# Patient Record
Sex: FEMALE | Race: WHITE
[De-identification: names, ages, dates, MRNs, and addresses within clinical notes are randomized per-mention and may not be internally consistent; named-entity substitution may affect disease eponyms.]

---

## 2017-02-09 ENCOUNTER — HOSPITAL ENCOUNTER (OUTPATIENT)
Dept: HOSPITAL 47 - RADMAMWWP | Age: 82
Discharge: HOME | End: 2017-02-09
Payer: MEDICARE

## 2017-02-09 DIAGNOSIS — Z12.31: Primary | ICD-10-CM

## 2017-02-09 PROCEDURE — 77063 BREAST TOMOSYNTHESIS BI: CPT

## 2017-02-10 NOTE — MM
Reason for exam: screening  (asymptomatic).

Last mammogram was performed 1 year and 1 month ago.



History:

Patient is postmenopausal and has history of high-risk lesion on a previous biopsy

at age 70.

High risk left mammotome panel of the left breast, December 28, 2005.



Physical Findings:

A clinical breast exam by your physician is recommended on an annual basis and 

results should be correlated with mammographic findings.



MG 3D Screening Mammo W/Cad

Bilateral CC and MLO view(s) were taken.

Prior study comparison: January 8, 2016, bilateral MG screening mammo w CAD.  

December 30, 2014, bilateral MG screening mammo w CAD.

The breast tissue is almost entirely fat.  Previous mammotome biopsy in the left 

breast. Asymmetric breast tissue in the right breast is stable with 

calcifications.  No significant changes when compared with prior studies.





ASSESSMENT: Benign, BI-RAD 2



RECOMMENDATION:

Routine screening mammogram of both breasts in 1 year.

## 2017-03-09 ENCOUNTER — HOSPITAL ENCOUNTER (OUTPATIENT)
Dept: HOSPITAL 47 - PROCWHC3 | Age: 82
Discharge: HOME | End: 2017-03-09
Payer: MEDICARE

## 2017-03-09 VITALS
SYSTOLIC BLOOD PRESSURE: 153 MMHG | TEMPERATURE: 98.4 F | HEART RATE: 62 BPM | DIASTOLIC BLOOD PRESSURE: 72 MMHG | RESPIRATION RATE: 18 BRPM

## 2017-03-09 DIAGNOSIS — M81.0: Primary | ICD-10-CM

## 2017-03-09 PROCEDURE — 96372 THER/PROPH/DIAG INJ SC/IM: CPT

## 2017-09-28 ENCOUNTER — HOSPITAL ENCOUNTER (OUTPATIENT)
Dept: HOSPITAL 47 - PROCWHC3 | Age: 82
Discharge: HOME | End: 2017-09-28
Payer: MEDICARE

## 2017-09-28 VITALS
HEART RATE: 65 BPM | RESPIRATION RATE: 18 BRPM | DIASTOLIC BLOOD PRESSURE: 72 MMHG | SYSTOLIC BLOOD PRESSURE: 137 MMHG | TEMPERATURE: 97.9 F

## 2017-09-28 DIAGNOSIS — M81.0: Primary | ICD-10-CM

## 2017-09-28 PROCEDURE — 96372 THER/PROPH/DIAG INJ SC/IM: CPT

## 2018-09-21 ENCOUNTER — HOSPITAL ENCOUNTER (OUTPATIENT)
Dept: HOSPITAL 47 - RADMAMWWP | Age: 83
Discharge: HOME | End: 2018-09-21
Attending: INTERNAL MEDICINE
Payer: MEDICARE

## 2018-09-21 DIAGNOSIS — R92.8: Primary | ICD-10-CM

## 2018-09-21 PROCEDURE — 77065 DX MAMMO INCL CAD UNI: CPT

## 2018-09-21 PROCEDURE — 76642 ULTRASOUND BREAST LIMITED: CPT

## 2018-09-21 PROCEDURE — 77061 BREAST TOMOSYNTHESIS UNI: CPT

## 2018-09-24 NOTE — MM
Reason for exam: additional evaluation requested from abnormal screening.

Last mammogram was performed less than 1 month ago.



History:

Patient is postmenopausal and has history of high-risk lesion on a previous biopsy

at age 70.

High risk left mammotome panel of the left breast, December 28, 2005.



Physical Findings:

Nurse Summary: 1 x 1cm nodule in the left breast at 9 o'clock (nurse ts).



MG 3D Work Up W/Cad RT

CC with magnification, LM with magnification, and LM view(s) were taken of the 

right breast.

Prior study comparison: September 7, 2018, bilateral MG screening mammo w CAD.  

February 9, 2017, bilateral MG 3d screening mammo w/cad.

There are scattered fibroglandular densities.

Finding: There are indeterminate calcifications in the upper outer quadrant of the

right breast. Recommend needle localization with open biopsy given thickness of 

breast.



These results were verbally communicated with the patient and result sheet given 

to the patient on 9/21/18.





ASSESSMENT: Suspicious, BI-RAD 4



RECOMMENDATION:

Localization and excision of the right breast.

Patient refused to schedule with surgeon at this point.

## 2018-09-24 NOTE — USB
Reason for exam: additional evaluation requested from abnormal screening.



History:

Patient is postmenopausal and has history of high-risk lesion on a previous biopsy

at age 70.

High risk left mammotome panel of the left breast, December 28, 2005.



US Breast Workup Limited LT

Left limited breast ultrasound including focal area of concern, retroareolar and 

axilla demonstrates a 6 x 4 x 6mm oval, hypoechoic lesion at 9 o'clock, probable 

sebaceous cyst, biopsy recommended for right breast calcifications.



These results were verbally communicated with the patient and result sheet given 

to the patient on 9/21/18.





ASSESSMENT: Benign, BI-RAD 2



RECOMMENDATION:

Clinical management of the left breast.

Right needle localization recommended.

Patient refused to schedule with surgeon at this point.

## 2018-10-25 ENCOUNTER — HOSPITAL ENCOUNTER (OUTPATIENT)
Dept: HOSPITAL 47 - WWCWWP | Age: 83
Discharge: HOME | End: 2018-10-25
Attending: SURGERY
Payer: MEDICARE

## 2018-10-25 VITALS
RESPIRATION RATE: 18 BRPM | HEART RATE: 88 BPM | TEMPERATURE: 98.1 F | SYSTOLIC BLOOD PRESSURE: 190 MMHG | DIASTOLIC BLOOD PRESSURE: 88 MMHG

## 2018-10-25 VITALS — BODY MASS INDEX: 19.1 KG/M2

## 2018-10-25 DIAGNOSIS — Z53.9: Primary | ICD-10-CM

## 2018-10-25 NOTE — P.GSHP
History of Present Illness


H&P Date: 10/25/18


Chief Complaint: Radiographic abnormalities bilateral breast





The patient is an 82-year-old white female who underwent a routine screening 

mammogram on 9718.  The findings here revealed #19 mm equal density isodense 

mass in both breast #2 indeterminate calcifications in the central position of 

the right breast.  The patient has undergone previous mammotome biopsy of the 

left breast.  The patient had supplemental views of the right breast [place] 

[place] [address].  On the supplemental views she was noted to have #1 

indeterminate calcifications in the upper outer quadrant of the right breast.   

Open surgical biopsy was recommended secondary to the size of the breast.  

Additionally the patient had an ultrasound performed of the left breast and on 

the left breast ultrasound the patient was recommended to have clinical 

management of a 6 x 6 mm oval hypoechoic lesion at 9:00 felt to likely 

represent a sebaceous cyst. The patient does not feel anything in her breast. 

She has no abnormal nipple discharge or changes.  She has no history of any 

trauma or infection in her breast. She has undergone a stero biopsy of the left 

breast in the past which was benign. 


Family history:


father:  ?type of cancer





Hormonal History:


menarche: 13


pregnancy: 5,  5 children, breast fed: 5, first born at 23


menopause: 50


BCP: none


hormones: none





Past Surgical History:


1. lesion removed from leg


2. ? gallbladder procedure





Past Medical History:


1. none





Social History:


smoke: none


alcohol: none


drugs: none


 





- Constitutional


Constitutional: Denies chills, Denies fever





- EENT


Eyes: denies blurred vision, denies pain


Ears: bilateral: decreased hearing, deny: tinnitus


Ears, nose, mouth and throat: Denies headache, Denies sore throat





- Breasts


Breasts: bilateral: as per HPI





- Cardiovascular


Cardiovascular: Reports high blood pressure, Denies chest pain, Denies 

shortness of breath





- Respiratory


Respiratory: Denies cough, Denies 7





- Gastrointestinal


Comment: 





? procedure on gallbladder


Gastrointestinal: Denies abdominal pain, Denies diarrhea, Denies nausea, Denies 

vomiting





- Genitourinary (Female)


Genitourinary: Denies dysuria, Denies hematuria





- Menstruation


Menstruation: Reports postmenopausal





- Musculoskeletal


Comment: 





arthritis in back


Musculoskeletal: Denies myalgias





- Integumentary


Integumentary: Denies pruritus, Denies rash





- Neurological


Neurological: Denies numbness, Denies weakness





- Psychiatric


Psychiatric: Denies anxiety, Denies depression





- Endocrine


Endocrine: Reports weight change, Denies fatigue





- Hematologic/Lymphatic


Comment: 





none





- Allergic/Immunologic


Comment: 





none





Past Medical History


Past Medical History: Hypertension


History of Any Multi-Drug Resistant Organisms: None Reported


Past Surgical History: Breast Surgery


Additional Past Surgical History / Comment(s): Hx. breast biopsy


Past Psychological History: No Psychological Hx Reported


Smoking Status: Never smoker


Past Alcohol Use History: None Reported


Past Drug Use History: None Reported





- Past Family History


  ** Father


Family Medical History: Cancer





  ** Mother


Additional Family Medical History / Comment(s):  at age 96





Medications and Allergies


 Home Medications











 Medication  Instructions  Recorded  Confirmed  Type


 


Atenolol 1 tab PO DAILY 14 History


 


Fish Oil/Dha/Epa [Fish Oil 1,200 2 tab PO DAILY 14 History





mg Fish Oil]    


 


Lisinopril 1 tab PO DAILY 14 History


 


Multivitamin/Iron/Folic Acid 1 tab PO DAILY 14 History





[Centrum Complete Multivit Tab]    


 


Simvastatin 1 tab PO DAILY 14 History


 


Aspirin 81 mg PO DAILY 08/17/15 09/28/17 History


 


HYDROcodone/APAP 5-325MG [Norco 5] 1 each PO Q6HR PRN 08/17/15 09/28/17 History


 


Latanoprost Ophth [Xalatan 0.005%] 1 drops BOTH EYES HS 08/17/15 09/28/17 

History











 Allergies











Allergy/AdvReac Type Severity Reaction Status Date / Time


 


No Known Allergies Allergy   Verified 17 13:38














Surgical - Exam





P: 88 


BP: 190/88 


R; 18


T: 98.1





BMI 19.1





- General





thin


no distress





- Eyes


normal ocular movement





- ENT


no hearing loss, no congestion





- Neck


no masses, trachea midline





- Respiratory


normal respiratory effort, clear to auscultation





- Cardiovascular


Rhythm: regular


Heart Sounds: normal: S1, S2





- Abdomen


Abdomen: soft, non tender, no guarding, no rigid, no rebound





- Integumentary


no rash, no abnormal pigmentation





- Neurologic


no disoriented, no combative





- Musculoskeletal


normal gait, normal posture





- Psychiatric


oriented to time, oriented to person, oriented to place, speech is normal, 

memory intact





breast exam:


right breast: very thin, multipositional exam no masses of concern


right axilla: no adenopathy of concern


left breast: very thin, no tavia on multipositional exam of concern


left axilla: no adenopathy of concern








Results





Radiographs were reviewed with the radiologist.  Upon review there is noted to 

be in the right breast an area of nodularity with calcifications.  As per 

radiologist this could be evaluated by ultrasound and possible ultrasound-

guided core biopsy performed rather than an open surgical biopsy.  In the left 

breast the area noted on ultrasound appears to be benign and repeat left breast 

ultrasound in 6 months time is recommended.





Assessment and Plan


Assessment: 





Impression:


1.  Radiographic abnormality bilateral breast


2.  Ultrasound of the right breast if this is amiable to ultrasound-guided core 

biopsy of this will be recommended otherwise surgical resection of nodule with 

calcifications will be recommended


3.  Repeat ultrasound of the left breast in 6 months time


4.  Retention


5.  Prior history of gallbladder disease


Plan:


1.  Ultrasound of the right breast depending on results of this ultrasound-

guided core biopsy of the lesion in the right breast versus surgical resection 

of the area of concern after needle localization


2.  Medical management of medical conditions


3.  Repeat left breast ultrasound in 6 months time


CC: Dr. Stuart

## 2018-11-07 ENCOUNTER — HOSPITAL ENCOUNTER (OUTPATIENT)
Dept: HOSPITAL 47 - RADUSWWP | Age: 83
Discharge: HOME | End: 2018-11-07
Attending: SURGERY
Payer: MEDICARE

## 2018-11-07 DIAGNOSIS — R92.8: Primary | ICD-10-CM

## 2018-11-07 NOTE — USB
Reason for exam: additional evaluation requested from prior study.



History:

Patient is postmenopausal and has history of high-risk lesion on a previous biopsy

at age 70.

High risk left mammotome panel of the left breast, December 28, 2005.



Physical Findings:

See exam from 9/21/18.



US Breast RT

Right complete breast ultrasound includes all four quadrants, the retroareolar 

region and axilla. Finding demonstrates calcifications not visible by ultrasound. 

Needle localization with open biopsy recommended.



These results were verbally communicated with the patient and result sheet given 

to the patient on 11/7/18.





ASSESSMENT: Suspicious, BI-RAD 4



RECOMMENDATION:

Localization and excision of the right breast.



Called with mammographic findings and has scheduled an appointment for the patient

for 11/15/18 at 3:40 with Dr. Hackett.





PRELIMINARY REPORT CALLED AND FAXED TO DR. HACKETT ON 11/7/18.

## 2019-07-26 ENCOUNTER — HOSPITAL ENCOUNTER (OUTPATIENT)
Dept: HOSPITAL 47 - PROCWHC3 | Age: 84
End: 2019-07-26
Attending: INTERNAL MEDICINE
Payer: MEDICARE

## 2019-07-26 VITALS
HEART RATE: 64 BPM | SYSTOLIC BLOOD PRESSURE: 159 MMHG | DIASTOLIC BLOOD PRESSURE: 76 MMHG | TEMPERATURE: 97.5 F | RESPIRATION RATE: 16 BRPM

## 2019-07-26 DIAGNOSIS — M81.0: Primary | ICD-10-CM

## 2019-07-26 PROCEDURE — 96372 THER/PROPH/DIAG INJ SC/IM: CPT

## 2019-09-18 ENCOUNTER — HOSPITAL ENCOUNTER (OUTPATIENT)
Dept: HOSPITAL 47 - RADMAMWWP | Age: 84
Discharge: HOME | End: 2019-09-18
Attending: INTERNAL MEDICINE
Payer: MEDICARE

## 2019-09-18 DIAGNOSIS — R92.8: Primary | ICD-10-CM

## 2019-09-18 PROCEDURE — 77066 DX MAMMO INCL CAD BI: CPT

## 2019-09-18 PROCEDURE — 77062 BREAST TOMOSYNTHESIS BI: CPT

## 2019-09-18 NOTE — MM
Reason for exam: additional evaluation requested from prior study.

Last mammogram was performed 1 year ago.



History:

Patient is postmenopausal and has history of high-risk lesion on a previous biopsy

at age 70.

High risk left mammotome panel of the left breast, December 28, 2005.



Physical Findings:

Nurse did not find any significant physical abnormalities on exam.



MG 3D Diag Mammo W/Cad CODI

Bilateral CC and MLO view(s) were taken.

Prior study comparison: September 21, 2018, right breast MG 3d work up w/cad RT.  

September 7, 2018, bilateral MG screening mammo w CAD.

There are scattered fibroglandular densities.  Previous mammotome biopsy in the 

left breast. Focal asymmetry disperses on compression.



These results were verbally communicated with the patient and result sheet given 

to the patient on 9/18/19.





ASSESSMENT: Benign, BI-RAD 2



RECOMMENDATION:

Routine screening mammogram of both breasts in 1 year.

## 2020-01-28 ENCOUNTER — HOSPITAL ENCOUNTER (OUTPATIENT)
Dept: HOSPITAL 47 - PROCWHC3 | Age: 85
Discharge: HOME | End: 2020-01-28
Attending: INTERNAL MEDICINE
Payer: MEDICARE

## 2020-01-28 VITALS
TEMPERATURE: 98.3 F | SYSTOLIC BLOOD PRESSURE: 171 MMHG | RESPIRATION RATE: 16 BRPM | HEART RATE: 71 BPM | DIASTOLIC BLOOD PRESSURE: 92 MMHG

## 2020-01-28 DIAGNOSIS — M81.0: Primary | ICD-10-CM

## 2020-01-28 PROCEDURE — 96372 THER/PROPH/DIAG INJ SC/IM: CPT

## 2020-07-30 ENCOUNTER — HOSPITAL ENCOUNTER (OUTPATIENT)
Dept: HOSPITAL 47 - PROCWHC3 | Age: 85
Discharge: HOME | End: 2020-07-30
Attending: INTERNAL MEDICINE
Payer: MEDICARE

## 2020-07-30 VITALS
SYSTOLIC BLOOD PRESSURE: 147 MMHG | RESPIRATION RATE: 16 BRPM | HEART RATE: 64 BPM | TEMPERATURE: 98 F | DIASTOLIC BLOOD PRESSURE: 67 MMHG

## 2020-07-30 DIAGNOSIS — M81.0: Primary | ICD-10-CM

## 2020-07-30 PROCEDURE — 96372 THER/PROPH/DIAG INJ SC/IM: CPT

## 2020-10-23 ENCOUNTER — HOSPITAL ENCOUNTER (OUTPATIENT)
Dept: HOSPITAL 47 - RADMAMWWP | Age: 85
Discharge: HOME | End: 2020-10-23
Attending: INTERNAL MEDICINE
Payer: MEDICARE

## 2020-10-23 DIAGNOSIS — M81.0: ICD-10-CM

## 2020-10-23 DIAGNOSIS — Z12.31: Primary | ICD-10-CM

## 2020-10-23 PROCEDURE — 77063 BREAST TOMOSYNTHESIS BI: CPT

## 2020-10-23 PROCEDURE — 77067 SCR MAMMO BI INCL CAD: CPT

## 2020-10-23 PROCEDURE — 77080 DXA BONE DENSITY AXIAL: CPT

## 2020-10-23 NOTE — BD
EXAMINATION TYPE: Axial Bone Density

 

DATE OF EXAM: 10/23/2020

 

COMPARISON: 09.07.2018

 

CLINICAL HISTORY: 84 YR OLD FEMALE.....ICD-10 CODE:  M81.0 AGE RELATED OSTEOPOROSIS 

 

Height:  57.2

Weight:  100

 

FRAX RISK QUESTIONS:

NOTHING TO NOTE HERE

 

RISK FACTORS 

HISTORY OF: 

 

Diet low in dairy products/other sources of calcium:  YES

Postmenopausal woman: YES 52

Lost more than 2 inches in height since high school: YES

Hyperparathyroidism: NO

Adrenal Insufficiency: NO

 

MEDICATIONS: 

Additional Medications: BP MEDS, VITAMINS AND PAIN MED PRN  

Additional History: HYPERTENSION

 

EXAM MEASUREMENTS: 

Bone mineral densitometry was performed using the [employer].

Bone mineral density as measured about the Lumbar spine is:  

----- L1-L4(G/cm2): 0.905

T Score Values are as follows:

----- L1:  -4.0

----- L2:  -3.0

----- L3:  -1.5

----- L4:  -1.2

----- L1-L4:  -2.3

Bone mineral density has: Increased 22.2% since study of: 09.07.2018

 

Bone mineral density about the R hip (g/cm2): 0.572

Bone mineral density about the L hip (g/cm2):  0.555

T Score values are as follows:

-----R Neck:  -2.8

-----L Neck:  -2.8

-----R Total:  -3.5

-----L Total:  -3.6

Bone mineral density has: Decreased -7.7% since study of: 09.07.2018

 

FRAX%s:    THERE IS A 19.8% CHANCE FOR A MAJOR OSTEOPOROTIC FX AND A 7.9% FOR HIP......PROBABILITY FO
R FX IN 10 YRS TIME

 

 

IMPRESSION:

Osteoporosis (T Score less than -2.5).

 

There is increased fracture risk and therapy is usually indicated based on age.

 

Re-Screen 1-2 years.

 

NOTE:  T-SCORE=SD OF THE YOUNG ADULT MEAN.

## 2020-10-27 NOTE — MM
Reason for exam: screening  (asymptomatic).

Last mammogram was performed 1 year and 1 month ago.



History:

Patient is postmenopausal and has history of high-risk lesion on a previous biopsy

at age 70.

High risk left mammotome panel of the left breast, December 28, 2005.



Physical Findings:

A clinical breast exam by your physician is recommended on an annual basis and 

results should be correlated with mammographic findings.



MG 3D Screening Mammo W/Cad

Bilateral CC and MLO view(s) were taken.

Prior study comparison: September 18, 2019, bilateral MG 3d diag mammo w/cad CODI. 

September 21, 2018, right breast MG 3d work up w/cad RT.

There are scattered fibroglandular densities.  Previous mammotome biopsy in the 

left breast. There is chronic nodularity in the left breast.  No significant 

changes when compared with prior studies.





ASSESSMENT: Negative, BI-RAD 1



RECOMMENDATION:

Routine screening mammogram of both breasts in 1 year.

## 2021-08-09 ENCOUNTER — HOSPITAL ENCOUNTER (OUTPATIENT)
Dept: HOSPITAL 47 - PROCWHC3 | Age: 86
End: 2021-08-09
Attending: INTERNAL MEDICINE
Payer: MEDICARE

## 2021-08-09 VITALS
SYSTOLIC BLOOD PRESSURE: 162 MMHG | TEMPERATURE: 98.8 F | DIASTOLIC BLOOD PRESSURE: 83 MMHG | HEART RATE: 64 BPM | RESPIRATION RATE: 16 BRPM

## 2021-08-09 DIAGNOSIS — M81.0: Primary | ICD-10-CM

## 2021-08-09 PROCEDURE — 96372 THER/PROPH/DIAG INJ SC/IM: CPT

## 2022-01-21 ENCOUNTER — HOSPITAL ENCOUNTER (OUTPATIENT)
Dept: HOSPITAL 47 - RADMAMWWP | Age: 87
Discharge: HOME | End: 2022-01-21
Attending: INTERNAL MEDICINE
Payer: MEDICARE

## 2022-01-21 DIAGNOSIS — Z78.0: ICD-10-CM

## 2022-01-21 DIAGNOSIS — Z12.31: Primary | ICD-10-CM

## 2022-01-21 PROCEDURE — 77067 SCR MAMMO BI INCL CAD: CPT

## 2022-01-21 PROCEDURE — 77063 BREAST TOMOSYNTHESIS BI: CPT

## 2022-01-25 NOTE — MM
Reason for exam: screening  (asymptomatic).

Last mammogram was performed 1 year and 3 months ago.



History:

Patient is postmenopausal and has history of high-risk lesion on a previous biopsy

at age 70.

High risk left mammotome panel of the left breast, December 28, 2005.



Physical Findings:

A clinical breast exam by your physician is recommended on an annual basis and 

results should be correlated with mammographic findings.



MG 3D Screening Mammo W/Cad

Bilateral CC and MLO view(s) were taken.

Prior study comparison: October 23, 2020, bilateral MG 3d screening mammo w/cad.  

September 18, 2019, bilateral MG 3d diag mammo w/cad CODI.

There are scattered fibroglandular densities.  Previous mammotome biopsy in the 

left breast. There is chronic nodularity in the left breast. Stable grouped course

calcifications right breast.  No significant changes when compared with prior 

studies.





ASSESSMENT: Benign, BI-RAD 2



RECOMMENDATION:

Routine screening mammogram of both breasts in 1 year.

## 2022-07-28 ENCOUNTER — HOSPITAL ENCOUNTER (OUTPATIENT)
Dept: HOSPITAL 47 - RADCTMAIN | Age: 87
Discharge: HOME | End: 2022-07-28
Attending: INTERNAL MEDICINE
Payer: MEDICARE

## 2022-07-28 DIAGNOSIS — R10.84: Primary | ICD-10-CM

## 2022-07-28 PROCEDURE — 74177 CT ABD & PELVIS W/CONTRAST: CPT

## 2022-07-28 NOTE — CT
EXAMINATION TYPE: CT abdomen pelvis w con

 

DATE OF EXAM: 7/28/2022

 

COMPARISON: None

 

INDICATION: Generalized abdominal pain.

 

DLP: 301.7 mGycm, Automated exposure control for dose reduction was used.

 

CONTRAST:  70ml mL of Isovue 300. 

                        Study performed with Oral Contrast

 

TECHNIQUE: Axial images were obtained from above the diaphragm to the pubic rami in the axial plane a
t 5 mm thick sections.  Reconstructed images are reviewed on the computer in the coronal plane.  

 

FINDINGS:

 

Limited CT sections are obtained the lung bases.  The lung bases are clear.  Dense coronary artery ca
lcifications present.

 

CT ABDOMEN:

 

Liver: Normal

 

Spleen: Normal

 

Pancreas: Normal

 

Adrenal glands: The adrenal glands are normal.

 

Gallbladder: Surgically absent  

 

Kidneys: No masses are evident. No hydronephrosis is present.   No cysts are present.  Delayed images
 were obtained through the kidneys, which remain unremarkable.

 

Aorta: Vascular calcification is within the aorta. 

 

Inferior vena cava: Normal.

 

CT PELVIS: 

Loops of bowel within the abdomen and pelvis are normal.     There are loops of bowel which are incom
pletely distended or lack oral contrast limiting their evaluation.

 

Appendix: Normal as visualized.

 

Urinary bladder: Normal. 

 

Genitourinary structures: Uterus appears normal. Adnexa are within normal limits

 

Osseous structures: No suspicious lytic or sclerotic lesions. Facet changes are within the lower lumb
ar spine. Scoliosis is.

 

IMPRESSIONS:

1.  No suspicious abdomen abnormality to account for abdominal pain